# Patient Record
Sex: FEMALE
[De-identification: names, ages, dates, MRNs, and addresses within clinical notes are randomized per-mention and may not be internally consistent; named-entity substitution may affect disease eponyms.]

---

## 2023-08-17 ENCOUNTER — APPOINTMENT (OUTPATIENT)
Dept: NEUROLOGY | Facility: CLINIC | Age: 78
End: 2023-08-17
Payer: MEDICARE

## 2023-08-17 VITALS
HEART RATE: 73 BPM | SYSTOLIC BLOOD PRESSURE: 143 MMHG | WEIGHT: 106 LBS | DIASTOLIC BLOOD PRESSURE: 86 MMHG | BODY MASS INDEX: 22.87 KG/M2 | OXYGEN SATURATION: 97 % | TEMPERATURE: 98.3 F | HEIGHT: 57 IN

## 2023-08-17 DIAGNOSIS — Z86.39 PERSONAL HISTORY OF OTHER ENDOCRINE, NUTRITIONAL AND METABOLIC DISEASE: ICD-10-CM

## 2023-08-17 DIAGNOSIS — Z83.3 FAMILY HISTORY OF DIABETES MELLITUS: ICD-10-CM

## 2023-08-17 DIAGNOSIS — R42 DIZZINESS AND GIDDINESS: ICD-10-CM

## 2023-08-17 DIAGNOSIS — Z78.9 OTHER SPECIFIED HEALTH STATUS: ICD-10-CM

## 2023-08-17 DIAGNOSIS — Z86.79 PERSONAL HISTORY OF OTHER DISEASES OF THE CIRCULATORY SYSTEM: ICD-10-CM

## 2023-08-17 PROBLEM — Z00.00 ENCOUNTER FOR PREVENTIVE HEALTH EXAMINATION: Status: ACTIVE | Noted: 2023-08-17

## 2023-08-17 PROCEDURE — 99203 OFFICE O/P NEW LOW 30 MIN: CPT

## 2023-08-17 RX ORDER — SIMVASTATIN 40 MG/1
40 TABLET, FILM COATED ORAL DAILY
Refills: 0 | Status: ACTIVE | COMMUNITY

## 2023-08-17 RX ORDER — AMLODIPINE BESYLATE 5 MG/1
5 TABLET ORAL DAILY
Refills: 0 | Status: ACTIVE | COMMUNITY

## 2023-08-17 NOTE — HISTORY OF PRESENT ILLNESS
[FreeTextEntry1] : 78-year-old woman with hypertension and hyperlipidemia who presents with daughter for evaluation of dizziness.  She reports that she usually goes for a walk in the afternoon and on the way back she will often feel lightheaded, as if she is about to pass out, though she has not actually lost consciousness.  Sometimes feels similar in the morning right after getting out of bed.  No spinning sensation.  No numbness or tingling in feet.

## 2023-08-17 NOTE — ASSESSMENT
[FreeTextEntry1] : 78-year-old woman with episodic lightheadedness, likely due to mild orthostasis.  -Encouraged hydration -Can try taking BP meds in PM instead of AM -No additional work up or treatment at this time

## 2023-08-17 NOTE — PHYSICAL EXAM
[FreeTextEntry1] : Physical Exam Constitutional: no apparent distress Psychiatric: normal affect, euthymic, alert and oriented x 3  Neurologic Exam: Mental Status: awake and alert, speech fluent and prosodic with no paraphasic errors Cranial Nerves: I: deferred; II: pupils equal round and reactive III, IV, VI: extraocular movements full with no nystagmus; V: facial sensation intact and symmetric; VII: facial power symmetric; VIII: hearing intact to finger rub; IX/X:  no dysarthria; XI: shoulder shrug symmetric; XII: tongue protrudes midline Motor: normal bulk and tone, no orbiting or pronator drift, R arm in cast due to recent wrist fracture Sensation: intact to temperature and vibration in distal lower extremities bilaterally Coordination: finger-nose-finger intact bilaterally Reflexes: 2+ biceps, triceps, brachioradialis, patella Gait: narrow base, normal stance and stride, normal arm swing

## 2023-08-17 NOTE — CONSULT LETTER
[Dear  ___] : Dear ~ISAI, [Consult Letter:] : I had the pleasure of evaluating your patient, [unfilled]. [Please see my note below.] : Please see my note below. [Consult Closing:] : Thank you very much for allowing me to participate in the care of this patient.  If you have any questions, please do not hesitate to contact me. [Sincerely,] : Sincerely, [FreeTextEntry2] : LALITO Mireles [FreeTextEntry3] : Verito Fry MD